# Patient Record
Sex: FEMALE | Race: WHITE | NOT HISPANIC OR LATINO | URBAN - NONMETROPOLITAN AREA
[De-identification: names, ages, dates, MRNs, and addresses within clinical notes are randomized per-mention and may not be internally consistent; named-entity substitution may affect disease eponyms.]

---

## 2021-09-15 ENCOUNTER — IMPORTED ENCOUNTER (OUTPATIENT)
Dept: URBAN - NONMETROPOLITAN AREA CLINIC 1 | Facility: CLINIC | Age: 76
End: 2021-09-15

## 2021-09-15 ENCOUNTER — PREPPED CHART (OUTPATIENT)
Dept: RURAL CLINIC 3 | Facility: CLINIC | Age: 76
End: 2021-09-15

## 2021-09-15 PROBLEM — H52.4: Noted: 2021-09-15

## 2021-09-15 PROBLEM — Z96.1: Noted: 2021-09-15

## 2021-09-15 PROBLEM — H26.493: Noted: 2021-09-15

## 2021-09-15 PROBLEM — H34.8310: Noted: 2021-09-15

## 2021-09-15 PROCEDURE — 99204 OFFICE O/P NEW MOD 45 MIN: CPT

## 2021-09-15 PROCEDURE — 92015 DETERMINE REFRACTIVE STATE: CPT

## 2021-09-15 NOTE — PATIENT DISCUSSION
BRVO ODDiscussed diagnosis in detail with patient. Recommend refer to [facility] for further evaluation. Patient elects to schedule. P/C IOL OUDiscussed diagnosis in detail with patient. Both intraocular implants in place and stable. Mild PCO noted OU but not visually significant. Continue to monitor. Presbyopia OUDiscussed refractive status in detail with patient. MR done today but do not recommend updating due to BRVO. Continue to monitor.

## 2022-02-17 ASSESSMENT — VISUAL ACUITY
OS_BAT: 20/100
OD_BAT: 20/90

## 2022-02-22 ENCOUNTER — CONSULTATION/EVALUATION (OUTPATIENT)
Dept: RURAL CLINIC 3 | Facility: CLINIC | Age: 77
End: 2022-02-22

## 2022-02-22 DIAGNOSIS — H26.493: ICD-10-CM

## 2022-02-22 PROCEDURE — 66821 AFTER CATARACT LASER SURGERY: CPT

## 2022-02-22 PROCEDURE — 99214 OFFICE O/P EST MOD 30 MIN: CPT

## 2022-02-22 ASSESSMENT — VISUAL ACUITY
OS_BAT: 20/200
OD_BAT: 20/200
OS_PH: 20/30
OD_PH: 20/50
OS_SC: 20/40
OD_SC: 20/60

## 2022-02-22 ASSESSMENT — TONOMETRY
OS_IOP_MMHG: 16
OD_IOP_MMHG: 16

## 2022-02-22 NOTE — PATIENT DISCUSSION
(Surgical)  Visually Significant secondary to glare; recommend YAG Cap OS. Pros, cons, risks and benefits discussed with patient. Patient wishes to proceed. Completed without complications.

## 2022-02-22 NOTE — PROCEDURE NOTE: CLINICAL
PROCEDURE NOTE: YAG Capsulotomy OS. Diagnosis: Other Secondary Cataract. Anesthesia: Topical. The purpose and nature of the procedure, possible alternative methods of treatment, the risks involved and the possibility of complications were discussed with patient. The Patient wishes to proceed and the consent was signed. 1 gtt Prolensa applied. The laser was then performed under topical anesthesia with no complications. Post op instructions were given to patient as well as a follow-up appointment. Patient was advised to call our office if any questions or concerns. Rodolfo Kruse

## 2022-04-09 ASSESSMENT — VISUAL ACUITY
OS_GLARE: 20/100
OD_GLARE: 20/90
OD_CC: 20/70
OS_CC: 20/25

## 2022-04-09 ASSESSMENT — TONOMETRY
OS_IOP_MMHG: 15
OD_IOP_MMHG: 15

## 2024-02-01 ENCOUNTER — FOLLOW UP (OUTPATIENT)
Dept: RURAL CLINIC 3 | Facility: CLINIC | Age: 79
End: 2024-02-01

## 2024-02-01 DIAGNOSIS — H34.8310: ICD-10-CM

## 2024-02-01 DIAGNOSIS — H35.3121: ICD-10-CM

## 2024-02-01 DIAGNOSIS — H26.491: ICD-10-CM

## 2024-02-01 PROCEDURE — 92134 CPTRZ OPH DX IMG PST SGM RTA: CPT

## 2024-02-01 PROCEDURE — 99214 OFFICE O/P EST MOD 30 MIN: CPT

## 2024-02-01 ASSESSMENT — TONOMETRY
OD_IOP_MMHG: 15
OS_IOP_MMHG: 15

## 2024-02-01 ASSESSMENT — VISUAL ACUITY
OD_SC: 20/50
OS_SC: 20/40
OS_PH: 20/30

## 2024-02-26 ENCOUNTER — CONSULTATION/EVALUATION (OUTPATIENT)
Dept: RURAL CLINIC 3 | Facility: CLINIC | Age: 79
End: 2024-02-26

## 2024-02-26 DIAGNOSIS — H26.491: ICD-10-CM

## 2024-02-26 PROCEDURE — 99214 OFFICE O/P EST MOD 30 MIN: CPT

## 2024-02-26 PROCEDURE — 66821 AFTER CATARACT LASER SURGERY: CPT

## 2024-02-26 ASSESSMENT — TONOMETRY
OD_IOP_MMHG: 13
OS_IOP_MMHG: 13

## 2024-02-26 ASSESSMENT — VISUAL ACUITY
OS_SC: 20/30
OD_SC: 20/60
OD_BAT: 20/200
OD_PH: 20/50

## 2024-03-13 ENCOUNTER — POST-OP (OUTPATIENT)
Dept: RURAL CLINIC 3 | Facility: CLINIC | Age: 79
End: 2024-03-13

## 2024-03-13 DIAGNOSIS — Z98.890: ICD-10-CM

## 2024-03-13 PROCEDURE — 99024 POSTOP FOLLOW-UP VISIT: CPT

## 2024-03-13 ASSESSMENT — TONOMETRY
OS_IOP_MMHG: 14
OD_IOP_MMHG: 14

## 2024-03-13 ASSESSMENT — VISUAL ACUITY
OS_SC: 20/30-1
OD_PH: 20/40
OD_SC: 20/50-1